# Patient Record
Sex: FEMALE | Race: BLACK OR AFRICAN AMERICAN | NOT HISPANIC OR LATINO | URBAN - METROPOLITAN AREA
[De-identification: names, ages, dates, MRNs, and addresses within clinical notes are randomized per-mention and may not be internally consistent; named-entity substitution may affect disease eponyms.]

---

## 2024-03-10 ENCOUNTER — APPOINTMENT (OUTPATIENT)
Dept: RADIOLOGY | Facility: HOSPITAL | Age: 34
End: 2024-03-10
Payer: COMMERCIAL

## 2024-03-10 ENCOUNTER — HOSPITAL ENCOUNTER (OUTPATIENT)
Facility: HOSPITAL | Age: 34
Setting detail: OBSERVATION
Discharge: HOME | End: 2024-03-12
Attending: EMERGENCY MEDICINE | Admitting: INTERNAL MEDICINE
Payer: COMMERCIAL

## 2024-03-10 DIAGNOSIS — J45.51 SEVERE PERSISTENT ASTHMA WITH EXACERBATION (MULTI): Primary | ICD-10-CM

## 2024-03-10 LAB
BASOPHILS # BLD AUTO: 0.05 X10*3/UL (ref 0–0.1)
BASOPHILS NFR BLD AUTO: 0.8 %
EOSINOPHIL # BLD AUTO: 0.4 X10*3/UL (ref 0–0.7)
EOSINOPHIL NFR BLD AUTO: 6.2 %
ERYTHROCYTE [DISTWIDTH] IN BLOOD BY AUTOMATED COUNT: 16.9 % (ref 11.5–14.5)
HCT VFR BLD AUTO: 35 % (ref 36–46)
HGB BLD-MCNC: 11.5 G/DL (ref 12–16)
IMM GRANULOCYTES # BLD AUTO: 0.02 X10*3/UL (ref 0–0.7)
IMM GRANULOCYTES NFR BLD AUTO: 0.3 % (ref 0–0.9)
LYMPHOCYTES # BLD AUTO: 1.63 X10*3/UL (ref 1.2–4.8)
LYMPHOCYTES NFR BLD AUTO: 25.3 %
MCH RBC QN AUTO: 25.3 PG (ref 26–34)
MCHC RBC AUTO-ENTMCNC: 32.9 G/DL (ref 32–36)
MCV RBC AUTO: 77 FL (ref 80–100)
MONOCYTES # BLD AUTO: 0.31 X10*3/UL (ref 0.1–1)
MONOCYTES NFR BLD AUTO: 4.8 %
NEUTROPHILS # BLD AUTO: 4.02 X10*3/UL (ref 1.2–7.7)
NEUTROPHILS NFR BLD AUTO: 62.6 %
NRBC BLD-RTO: 0 /100 WBCS (ref 0–0)
PLATELET # BLD AUTO: 311 X10*3/UL (ref 150–450)
RBC # BLD AUTO: 4.54 X10*6/UL (ref 4–5.2)
WBC # BLD AUTO: 6.4 X10*3/UL (ref 4.4–11.3)

## 2024-03-10 PROCEDURE — 71045 X-RAY EXAM CHEST 1 VIEW: CPT | Performed by: RADIOLOGY

## 2024-03-10 PROCEDURE — 85025 COMPLETE CBC W/AUTO DIFF WBC: CPT | Performed by: EMERGENCY MEDICINE

## 2024-03-10 PROCEDURE — 2500000002 HC RX 250 W HCPCS SELF ADMINISTERED DRUGS (ALT 637 FOR MEDICARE OP, ALT 636 FOR OP/ED): Mod: SE | Performed by: EMERGENCY MEDICINE

## 2024-03-10 PROCEDURE — 94660 CPAP INITIATION&MGMT: CPT

## 2024-03-10 PROCEDURE — 2500000005 HC RX 250 GENERAL PHARMACY W/O HCPCS

## 2024-03-10 PROCEDURE — 94640 AIRWAY INHALATION TREATMENT: CPT

## 2024-03-10 PROCEDURE — 2500000004 HC RX 250 GENERAL PHARMACY W/ HCPCS (ALT 636 FOR OP/ED)

## 2024-03-10 PROCEDURE — 99291 CRITICAL CARE FIRST HOUR: CPT | Mod: 25 | Performed by: EMERGENCY MEDICINE

## 2024-03-10 PROCEDURE — 71045 X-RAY EXAM CHEST 1 VIEW: CPT

## 2024-03-10 PROCEDURE — 99291 CRITICAL CARE FIRST HOUR: CPT | Performed by: EMERGENCY MEDICINE

## 2024-03-10 PROCEDURE — 84132 ASSAY OF SERUM POTASSIUM: CPT | Performed by: EMERGENCY MEDICINE

## 2024-03-10 PROCEDURE — 87636 SARSCOV2 & INF A&B AMP PRB: CPT | Performed by: EMERGENCY MEDICINE

## 2024-03-10 PROCEDURE — 36415 COLL VENOUS BLD VENIPUNCTURE: CPT | Performed by: EMERGENCY MEDICINE

## 2024-03-10 PROCEDURE — 87634 RSV DNA/RNA AMP PROBE: CPT | Performed by: STUDENT IN AN ORGANIZED HEALTH CARE EDUCATION/TRAINING PROGRAM

## 2024-03-10 RX ORDER — ALBUTEROL SULFATE 0.83 MG/ML
SOLUTION RESPIRATORY (INHALATION)
Status: DISPENSED
Start: 2024-03-10 | End: 2024-03-11

## 2024-03-10 RX ORDER — EPINEPHRINE 1 MG/ML
INJECTION, SOLUTION, CONCENTRATE INTRAVENOUS
Status: COMPLETED
Start: 2024-03-10 | End: 2024-03-10

## 2024-03-10 RX ORDER — EPINEPHRINE 0.1 MG/ML
0.3 INJECTION INTRACARDIAC; INTRAVENOUS ONCE
Status: DISCONTINUED | OUTPATIENT
Start: 2024-03-10 | End: 2024-03-12 | Stop reason: HOSPADM

## 2024-03-10 RX ORDER — ALBUTEROL SULFATE 0.83 MG/ML
2.5 SOLUTION RESPIRATORY (INHALATION)
Status: COMPLETED | OUTPATIENT
Start: 2024-03-10 | End: 2024-03-10

## 2024-03-10 RX ADMIN — ALBUTEROL SULFATE 2.5 MG: 2.5 SOLUTION RESPIRATORY (INHALATION) at 22:25

## 2024-03-10 RX ADMIN — ALBUTEROL SULFATE 2.5 MG: 2.5 SOLUTION RESPIRATORY (INHALATION) at 22:10

## 2024-03-10 RX ADMIN — ALBUTEROL SULFATE 2.5 MG: 2.5 SOLUTION RESPIRATORY (INHALATION) at 21:55

## 2024-03-10 RX ADMIN — EPINEPHRINE 0.3 MG: 1 INJECTION, SOLUTION, CONCENTRATE INTRAVENOUS at 21:50

## 2024-03-10 RX ADMIN — Medication: at 21:55

## 2024-03-11 PROBLEM — J45.51 SEVERE PERSISTENT ASTHMA WITH EXACERBATION (MULTI): Status: ACTIVE | Noted: 2024-03-11

## 2024-03-11 LAB
ANION GAP BLDV CALCULATED.4IONS-SCNC: 9 MMOL/L (ref 10–25)
BASE EXCESS BLDV CALC-SCNC: -0.8 MMOL/L (ref -2–3)
BASE EXCESS BLDV CALC-SCNC: -2.8 MMOL/L (ref -2–3)
BODY TEMPERATURE: 37 DEGREES CELSIUS
BODY TEMPERATURE: 37 DEGREES CELSIUS
CA-I BLDV-SCNC: 1.13 MMOL/L (ref 1.1–1.33)
CHLORIDE BLDV-SCNC: 103 MMOL/L (ref 98–107)
FERRITIN SERPL-MCNC: 24 NG/ML (ref 8–150)
FLUAV RNA RESP QL NAA+PROBE: NOT DETECTED
FLUBV RNA RESP QL NAA+PROBE: NOT DETECTED
GLUCOSE BLDV-MCNC: 156 MG/DL (ref 74–99)
HCO3 BLDV-SCNC: 23.2 MMOL/L (ref 22–26)
HCO3 BLDV-SCNC: 26.2 MMOL/L (ref 22–26)
HCT VFR BLD EST: 38 % (ref 36–46)
HGB BLDV-MCNC: 12.6 G/DL (ref 12–16)
INHALED O2 CONCENTRATION: 100 %
INHALED O2 CONCENTRATION: 30 %
IRON SATN MFR SERPL: 5 % (ref 25–45)
IRON SERPL-MCNC: 21 UG/DL (ref 35–150)
LACTATE BLDV-SCNC: 1.8 MMOL/L (ref 0.4–2)
OXYHGB MFR BLDV: 73.4 % (ref 45–75)
OXYHGB MFR BLDV: 87.2 % (ref 45–75)
PCO2 BLDV: 44 MM HG (ref 41–51)
PCO2 BLDV: 52 MM HG (ref 41–51)
PH BLDV: 7.31 PH (ref 7.33–7.43)
PH BLDV: 7.33 PH (ref 7.33–7.43)
PO2 BLDV: 49 MM HG (ref 35–45)
PO2 BLDV: 60 MM HG (ref 35–45)
POTASSIUM BLDV-SCNC: 3.5 MMOL/L (ref 3.5–5.3)
RSV RNA RESP QL NAA+PROBE: NOT DETECTED
SAO2 % BLDV: 74 % (ref 45–75)
SAO2 % BLDV: 88 % (ref 45–75)
SARS-COV-2 RNA RESP QL NAA+PROBE: NOT DETECTED
SODIUM BLDV-SCNC: 135 MMOL/L (ref 136–145)
TIBC SERPL-MCNC: 446 UG/DL (ref 240–445)
UIBC SERPL-MCNC: 425 UG/DL (ref 110–370)

## 2024-03-11 PROCEDURE — 96376 TX/PRO/DX INJ SAME DRUG ADON: CPT

## 2024-03-11 PROCEDURE — 94660 CPAP INITIATION&MGMT: CPT

## 2024-03-11 PROCEDURE — G0378 HOSPITAL OBSERVATION PER HR: HCPCS

## 2024-03-11 PROCEDURE — 94640 AIRWAY INHALATION TREATMENT: CPT

## 2024-03-11 PROCEDURE — 2500000002 HC RX 250 W HCPCS SELF ADMINISTERED DRUGS (ALT 637 FOR MEDICARE OP, ALT 636 FOR OP/ED): Performed by: STUDENT IN AN ORGANIZED HEALTH CARE EDUCATION/TRAINING PROGRAM

## 2024-03-11 PROCEDURE — C9113 INJ PANTOPRAZOLE SODIUM, VIA: HCPCS | Performed by: STUDENT IN AN ORGANIZED HEALTH CARE EDUCATION/TRAINING PROGRAM

## 2024-03-11 PROCEDURE — 99233 SBSQ HOSP IP/OBS HIGH 50: CPT

## 2024-03-11 PROCEDURE — 96374 THER/PROPH/DIAG INJ IV PUSH: CPT

## 2024-03-11 PROCEDURE — 82728 ASSAY OF FERRITIN: CPT | Performed by: STUDENT IN AN ORGANIZED HEALTH CARE EDUCATION/TRAINING PROGRAM

## 2024-03-11 PROCEDURE — 2500000004 HC RX 250 GENERAL PHARMACY W/ HCPCS (ALT 636 FOR OP/ED): Performed by: STUDENT IN AN ORGANIZED HEALTH CARE EDUCATION/TRAINING PROGRAM

## 2024-03-11 PROCEDURE — 36415 COLL VENOUS BLD VENIPUNCTURE: CPT | Performed by: STUDENT IN AN ORGANIZED HEALTH CARE EDUCATION/TRAINING PROGRAM

## 2024-03-11 PROCEDURE — 99291 CRITICAL CARE FIRST HOUR: CPT | Performed by: STUDENT IN AN ORGANIZED HEALTH CARE EDUCATION/TRAINING PROGRAM

## 2024-03-11 PROCEDURE — 82805 BLOOD GASES W/O2 SATURATION: CPT

## 2024-03-11 PROCEDURE — 94799 UNLISTED PULMONARY SVC/PX: CPT

## 2024-03-11 PROCEDURE — 36415 COLL VENOUS BLD VENIPUNCTURE: CPT

## 2024-03-11 PROCEDURE — 1100000001 HC PRIVATE ROOM DAILY

## 2024-03-11 PROCEDURE — 96375 TX/PRO/DX INJ NEW DRUG ADDON: CPT

## 2024-03-11 PROCEDURE — 83540 ASSAY OF IRON: CPT | Performed by: STUDENT IN AN ORGANIZED HEALTH CARE EDUCATION/TRAINING PROGRAM

## 2024-03-11 RX ORDER — IPRATROPIUM BROMIDE 0.5 MG/2.5ML
0.5 SOLUTION RESPIRATORY (INHALATION)
Status: DISCONTINUED | OUTPATIENT
Start: 2024-03-11 | End: 2024-03-11

## 2024-03-11 RX ORDER — ALBUTEROL SULFATE 0.83 MG/ML
2.5 SOLUTION RESPIRATORY (INHALATION)
COMMUNITY
Start: 2020-07-07 | End: 2024-03-12 | Stop reason: HOSPADM

## 2024-03-11 RX ORDER — FLUTICASONE FUROATE AND VILANTEROL 100; 25 UG/1; UG/1
1 POWDER RESPIRATORY (INHALATION)
Status: DISCONTINUED | OUTPATIENT
Start: 2024-03-11 | End: 2024-03-12 | Stop reason: HOSPADM

## 2024-03-11 RX ORDER — PANTOPRAZOLE SODIUM 40 MG/10ML
40 INJECTION, POWDER, LYOPHILIZED, FOR SOLUTION INTRAVENOUS DAILY
Status: DISCONTINUED | OUTPATIENT
Start: 2024-03-11 | End: 2024-03-12 | Stop reason: HOSPADM

## 2024-03-11 RX ORDER — IPRATROPIUM BROMIDE AND ALBUTEROL SULFATE 2.5; .5 MG/3ML; MG/3ML
3 SOLUTION RESPIRATORY (INHALATION)
Status: DISCONTINUED | OUTPATIENT
Start: 2024-03-11 | End: 2024-03-12 | Stop reason: HOSPADM

## 2024-03-11 RX ORDER — FLUTICASONE FUROATE AND VILANTEROL 100; 25 UG/1; UG/1
1 POWDER RESPIRATORY (INHALATION)
Status: CANCELLED | OUTPATIENT
Start: 2024-03-11

## 2024-03-11 RX ORDER — ALBUTEROL SULFATE 0.83 MG/ML
2.5 SOLUTION RESPIRATORY (INHALATION)
Status: DISCONTINUED | OUTPATIENT
Start: 2024-03-11 | End: 2024-03-11

## 2024-03-11 RX ORDER — ENOXAPARIN SODIUM 100 MG/ML
40 INJECTION SUBCUTANEOUS DAILY
Status: DISCONTINUED | OUTPATIENT
Start: 2024-03-11 | End: 2024-03-12 | Stop reason: HOSPADM

## 2024-03-11 RX ADMIN — METHYLPREDNISOLONE SODIUM SUCCINATE 40 MG: 125 INJECTION, POWDER, FOR SOLUTION INTRAMUSCULAR; INTRAVENOUS at 14:22

## 2024-03-11 RX ADMIN — IPRATROPIUM BROMIDE 0.5 MG: 0.5 SOLUTION RESPIRATORY (INHALATION) at 11:43

## 2024-03-11 RX ADMIN — ALBUTEROL SULFATE 2.5 MG: 2.5 SOLUTION RESPIRATORY (INHALATION) at 14:26

## 2024-03-11 RX ADMIN — METHYLPREDNISOLONE SODIUM SUCCINATE 40 MG: 125 INJECTION, POWDER, FOR SOLUTION INTRAMUSCULAR; INTRAVENOUS at 02:59

## 2024-03-11 RX ADMIN — METHYLPREDNISOLONE SODIUM SUCCINATE 40 MG: 125 INJECTION, POWDER, FOR SOLUTION INTRAMUSCULAR; INTRAVENOUS at 09:46

## 2024-03-11 RX ADMIN — IPRATROPIUM BROMIDE AND ALBUTEROL SULFATE 3 ML: .5; 3 SOLUTION RESPIRATORY (INHALATION) at 20:43

## 2024-03-11 RX ADMIN — PANTOPRAZOLE SODIUM 40 MG: 40 INJECTION, POWDER, FOR SOLUTION INTRAVENOUS at 09:46

## 2024-03-11 RX ADMIN — ENOXAPARIN SODIUM 40 MG: 100 INJECTION SUBCUTANEOUS at 09:46

## 2024-03-11 RX ADMIN — ALBUTEROL SULFATE 2.5 MG: 2.5 SOLUTION RESPIRATORY (INHALATION) at 11:39

## 2024-03-11 RX ADMIN — FLUTICASONE FUROATE AND VILANTEROL TRIFENATATE 1 PUFF: 100; 25 POWDER RESPIRATORY (INHALATION) at 07:59

## 2024-03-11 RX ADMIN — IPRATROPIUM BROMIDE 0.5 MG: 0.5 SOLUTION RESPIRATORY (INHALATION) at 07:59

## 2024-03-11 RX ADMIN — ALBUTEROL SULFATE 2.5 MG: 2.5 SOLUTION RESPIRATORY (INHALATION) at 03:17

## 2024-03-11 RX ADMIN — METHYLPREDNISOLONE SODIUM SUCCINATE 40 MG: 40 INJECTION, POWDER, FOR SOLUTION INTRAMUSCULAR; INTRAVENOUS at 21:43

## 2024-03-11 SDOH — SOCIAL STABILITY: SOCIAL INSECURITY: DOES ANYONE TRY TO KEEP YOU FROM HAVING/CONTACTING OTHER FRIENDS OR DOING THINGS OUTSIDE YOUR HOME?: NO

## 2024-03-11 SDOH — ECONOMIC STABILITY: TRANSPORTATION INSECURITY
IN THE PAST 12 MONTHS, HAS LACK OF TRANSPORTATION KEPT YOU FROM MEETINGS, WORK, OR FROM GETTING THINGS NEEDED FOR DAILY LIVING?: NO

## 2024-03-11 SDOH — ECONOMIC STABILITY: INCOME INSECURITY: IN THE PAST 12 MONTHS, HAS THE ELECTRIC, GAS, OIL, OR WATER COMPANY THREATENED TO SHUT OFF SERVICE IN YOUR HOME?: NO

## 2024-03-11 SDOH — ECONOMIC STABILITY: TRANSPORTATION INSECURITY
IN THE PAST 12 MONTHS, HAS THE LACK OF TRANSPORTATION KEPT YOU FROM MEDICAL APPOINTMENTS OR FROM GETTING MEDICATIONS?: NO

## 2024-03-11 SDOH — SOCIAL STABILITY: SOCIAL INSECURITY: WITHIN THE LAST YEAR, HAVE YOU BEEN HUMILIATED OR EMOTIONALLY ABUSED IN OTHER WAYS BY YOUR PARTNER OR EX-PARTNER?: NO

## 2024-03-11 SDOH — SOCIAL STABILITY: SOCIAL INSECURITY: DO YOU FEEL ANYONE HAS EXPLOITED OR TAKEN ADVANTAGE OF YOU FINANCIALLY OR OF YOUR PERSONAL PROPERTY?: NO

## 2024-03-11 SDOH — SOCIAL STABILITY: SOCIAL INSECURITY: ARE YOU OR HAVE YOU BEEN THREATENED OR ABUSED PHYSICALLY, EMOTIONALLY, OR SEXUALLY BY ANYONE?: NO

## 2024-03-11 SDOH — ECONOMIC STABILITY: FOOD INSECURITY: WITHIN THE PAST 12 MONTHS, THE FOOD YOU BOUGHT JUST DIDN'T LAST AND YOU DIDN'T HAVE MONEY TO GET MORE.: NEVER TRUE

## 2024-03-11 SDOH — SOCIAL STABILITY: SOCIAL INSECURITY: HAS ANYONE EVER THREATENED TO HURT YOUR FAMILY OR YOUR PETS?: NO

## 2024-03-11 SDOH — ECONOMIC STABILITY: HOUSING INSECURITY
IN THE LAST 12 MONTHS, WAS THERE A TIME WHEN YOU DID NOT HAVE A STEADY PLACE TO SLEEP OR SLEPT IN A SHELTER (INCLUDING NOW)?: NO

## 2024-03-11 SDOH — ECONOMIC STABILITY: INCOME INSECURITY: IN THE LAST 12 MONTHS, WAS THERE A TIME WHEN YOU WERE NOT ABLE TO PAY THE MORTGAGE OR RENT ON TIME?: NO

## 2024-03-11 SDOH — SOCIAL STABILITY: SOCIAL INSECURITY: ABUSE: ADULT

## 2024-03-11 SDOH — SOCIAL STABILITY: SOCIAL INSECURITY: ARE THERE ANY APPARENT SIGNS OF INJURIES/BEHAVIORS THAT COULD BE RELATED TO ABUSE/NEGLECT?: NO

## 2024-03-11 SDOH — ECONOMIC STABILITY: HOUSING INSECURITY: IN THE LAST 12 MONTHS, HOW MANY PLACES HAVE YOU LIVED?: 1

## 2024-03-11 SDOH — SOCIAL STABILITY: SOCIAL INSECURITY
WITHIN THE LAST YEAR, HAVE TO BEEN RAPED OR FORCED TO HAVE ANY KIND OF SEXUAL ACTIVITY BY YOUR PARTNER OR EX-PARTNER?: NO

## 2024-03-11 SDOH — SOCIAL STABILITY: SOCIAL INSECURITY: HAVE YOU HAD THOUGHTS OF HARMING ANYONE ELSE?: NO

## 2024-03-11 SDOH — SOCIAL STABILITY: SOCIAL INSECURITY: DO YOU FEEL UNSAFE GOING BACK TO THE PLACE WHERE YOU ARE LIVING?: NO

## 2024-03-11 SDOH — SOCIAL STABILITY: SOCIAL INSECURITY: WERE YOU ABLE TO COMPLETE ALL THE BEHAVIORAL HEALTH SCREENINGS?: YES

## 2024-03-11 SDOH — SOCIAL STABILITY: SOCIAL INSECURITY
WITHIN THE LAST YEAR, HAVE YOU BEEN KICKED, HIT, SLAPPED, OR OTHERWISE PHYSICALLY HURT BY YOUR PARTNER OR EX-PARTNER?: NO

## 2024-03-11 SDOH — ECONOMIC STABILITY: FOOD INSECURITY: WITHIN THE PAST 12 MONTHS, YOU WORRIED THAT YOUR FOOD WOULD RUN OUT BEFORE YOU GOT MONEY TO BUY MORE.: NEVER TRUE

## 2024-03-11 SDOH — SOCIAL STABILITY: SOCIAL INSECURITY: WITHIN THE LAST YEAR, HAVE YOU BEEN AFRAID OF YOUR PARTNER OR EX-PARTNER?: NO

## 2024-03-11 SDOH — ECONOMIC STABILITY: INCOME INSECURITY: HOW HARD IS IT FOR YOU TO PAY FOR THE VERY BASICS LIKE FOOD, HOUSING, MEDICAL CARE, AND HEATING?: NOT HARD AT ALL

## 2024-03-11 ASSESSMENT — COGNITIVE AND FUNCTIONAL STATUS - GENERAL
MOBILITY SCORE: 24
PATIENT BASELINE BEDBOUND: NO
DAILY ACTIVITIY SCORE: 24

## 2024-03-11 ASSESSMENT — PAIN - FUNCTIONAL ASSESSMENT
PAIN_FUNCTIONAL_ASSESSMENT: 0-10

## 2024-03-11 ASSESSMENT — ACTIVITIES OF DAILY LIVING (ADL)
HEARING - RIGHT EAR: FUNCTIONAL
JUDGMENT_ADEQUATE_SAFELY_COMPLETE_DAILY_ACTIVITIES: YES
FEEDING YOURSELF: INDEPENDENT
HEARING - LEFT EAR: FUNCTIONAL
DRESSING YOURSELF: INDEPENDENT
TOILETING: INDEPENDENT
LACK_OF_TRANSPORTATION: NO
PATIENT'S MEMORY ADEQUATE TO SAFELY COMPLETE DAILY ACTIVITIES?: YES
GROOMING: INDEPENDENT
ADEQUATE_TO_COMPLETE_ADL: YES
WALKS IN HOME: INDEPENDENT
BATHING: INDEPENDENT

## 2024-03-11 ASSESSMENT — LIFESTYLE VARIABLES
AUDIT-C TOTAL SCORE: 2
HOW MANY STANDARD DRINKS CONTAINING ALCOHOL DO YOU HAVE ON A TYPICAL DAY: 1 OR 2
HOW OFTEN DO YOU HAVE A DRINK CONTAINING ALCOHOL: 2-4 TIMES A MONTH
SKIP TO QUESTIONS 9-10: 1
HOW OFTEN DO YOU HAVE 6 OR MORE DRINKS ON ONE OCCASION: NEVER
AUDIT-C TOTAL SCORE: 2

## 2024-03-11 ASSESSMENT — PATIENT HEALTH QUESTIONNAIRE - PHQ9
1. LITTLE INTEREST OR PLEASURE IN DOING THINGS: NOT AT ALL
SUM OF ALL RESPONSES TO PHQ9 QUESTIONS 1 & 2: 0
2. FEELING DOWN, DEPRESSED OR HOPELESS: NOT AT ALL

## 2024-03-11 ASSESSMENT — COLUMBIA-SUICIDE SEVERITY RATING SCALE - C-SSRS
6. HAVE YOU EVER DONE ANYTHING, STARTED TO DO ANYTHING, OR PREPARED TO DO ANYTHING TO END YOUR LIFE?: NO
2. HAVE YOU ACTUALLY HAD ANY THOUGHTS OF KILLING YOURSELF?: NO
1. IN THE PAST MONTH, HAVE YOU WISHED YOU WERE DEAD OR WISHED YOU COULD GO TO SLEEP AND NOT WAKE UP?: NO

## 2024-03-11 ASSESSMENT — PAIN SCALES - GENERAL
PAINLEVEL_OUTOF10: 0 - NO PAIN
PAINLEVEL_OUTOF10: 0 - NO PAIN

## 2024-03-11 NOTE — CARE PLAN
Problem: Safety  Goal: Patient will be injury free during hospitalization  Outcome: Progressing  Goal: I will remain free of falls  Outcome: Progressing     Problem: Psychosocial Needs  Goal: Demonstrates ability to cope with hospitalization/illness  Outcome: Progressing  Goal: Collaborate with me, my family, and caregiver to identify my specific goals  Outcome: Progressing   The patient's goals for the shift include

## 2024-03-11 NOTE — ED PROVIDER NOTES
"HPI   No chief complaint on file.      33F with history of asthma ( w/ history of intubation) brought in by Ems for increased work of breathing and shortness of breath. Per EMS, patient was found \"doubled over\" with significantly increased WOB. En route she received IV solumedrol, duonebs, mag, and 1 dose of IM epinephrine. On presentation in Ed she is on NRB mask and has significant increased work of breathing. She is able to communicate and does not appear to have altered mental status. History limted due to patient compromised respiratory status. Per chart review, she has a history of multiple admissions for asthma episodes.                          No data recorded                   Patient History   No past medical history on file.  No past surgical history on file.  No family history on file.  Social History     Tobacco Use    Smoking status: Not on file    Smokeless tobacco: Not on file   Substance Use Topics    Alcohol use: Not on file    Drug use: Not on file       Physical Exam   ED Triage Vitals [03/10/24 2145]   Temperature Heart Rate Respirations BP   36.3 °C (97.3 °F) (!) 116 (!) 21 (!) 160/116      Pulse Ox Temp Source Heart Rate Source Patient Position   100 % Temporal -- --      BP Location FiO2 (%)     -- --       Physical Exam  Constitutional:       General: She is in acute distress.   HENT:      Head: Normocephalic and atraumatic.   Cardiovascular:      Rate and Rhythm: Regular rhythm. Tachycardia present.   Pulmonary:      Effort: Tachypnea and accessory muscle usage present.      Breath sounds: Wheezing present.      Comments: Increased WOB with accessory muscle use on non-rebreather   Abdominal:      Palpations: Abdomen is soft.   Musculoskeletal:      Cervical back: Normal range of motion.      Right lower leg: No edema.      Left lower leg: No edema.   Skin:     General: Skin is warm and dry.   Neurological:      General: No focal deficit present.      Mental Status: She is alert and " oriented to person, place, and time.   Psychiatric:         Mood and Affect: Mood normal.         ED Course & MDM   Diagnoses as of 03/11/24 0121   Severe persistent asthma with exacerbation       Medical Decision Making  33F presented to ED with increased WOB and signs of respiratory distress. En route was give steroids, magnesium, epinephrine, and duonebs with modest improvement. Patient placed on Bipap with observed benefit within 30 minutes. Additional dose of epinephrine given in ED. Serial VBG's obtained which demonstrated improvement in pH, pCO2 and pO2. Despite improvement she remains at increased risk for respiratory compromise considering her history of recurrent asthma exacerbations, difficulty with long acting inhaler compliance, and history of requiring intubation during asthma episode. She is to be admitted to the ICU for further management of asthma attack.          Procedure  Procedures     Helder Resendiz DO  Resident  03/11/24 0158

## 2024-03-11 NOTE — SIGNIFICANT EVENT
Floor Readiness Note       I, personally, evaluated Suha Zacarias prior to transfer to the floor, including reviewing all current laboratory and imaging studies. The patient remains appropriate for transfer to the floor. Bedside nurse and respiratory therapy are also in agreement of patient's readiness for the floor.     Brief summary:  Suha Zacarias is a 33 y.o. female who was admitted to the MICU on 3/10 for hypercapnic respiratory failure secondary to an asthma exacerbation secondary to missing doses for medications. They have been treated with methylprednisolone, ipratropium nebs, albuterol nebs, and Breo-Ellipta inhaler.     Updated focused Physical Exam:  Cardiovascular: Tachycardic, no rubs, murmurs or gallops  Pulmonary: Normal work of breathing, diffuse quiet expiratory wheezes    Current Vital Signs:  Heart Rate: 83 (03/11/24 1000 : Desire Morris RN)  BP: 124/72 (03/11/24 1000 : Desire Morris RN)  Temp: 37.4 °C (99.3 °F) (03/11/24 1142 : Sav Mosqueda)  Resp: 21 (03/11/24 1000 : Desire Morris RN)  SpO2: 95 % (03/11/24 1000 : Desire Morris RN)    Relevant updates since rounds:  none    Accepting team, Hospitalist B, received verbal sign out and the Provider Care team/Attending has been updated. Bedside nurse will now call accepting nurse for report and patient will be transferred to [place] [id number].    Delfino Beltran MD

## 2024-03-11 NOTE — ED PROVIDER NOTES
Patient was signed out to me by previous physician.  33-year-old female history of asthma who has been intubated previously presenting to the emergency department for asthma exacerbation continuously requiring BiPAP with persistent respiratory acidemia.  Mentating appropriately.  Received nebs, steroids, 2 doses of epinephrine, and is now on continuous albuterol along with IV magnesium.  She is still persistently rhonchorous and requiring consistent continuous albuterol with increased work of breathing.  Due to this patient's tenuous status with her asthma, she will be admitted to the intensive care unit.     Jeffrey Mireles MD  03/11/24 0116

## 2024-03-11 NOTE — ED PROCEDURE NOTE
Procedure  Critical Care    Performed by: Matt Levy MD  Authorized by: Matt Levy MD    Critical care provider statement:     Critical care time (minutes):  45    Critical care time was exclusive of:  Separately billable procedures and treating other patients    Critical care was necessary to treat or prevent imminent or life-threatening deterioration of the following conditions:  Respiratory failure    Critical care was time spent personally by me on the following activities:  Ordering and performing treatments and interventions, ordering and review of laboratory studies, ordering and review of radiographic studies, pulse oximetry, re-evaluation of patient's condition, blood draw for specimens, evaluation of patient's response to treatment and examination of patient               Matt Levy MD  03/10/24 7123

## 2024-03-11 NOTE — PROGRESS NOTES
Pharmacy Medication History Review    Suha Zacarias is a 33 y.o. female admitted for Severe persistent asthma with exacerbation. Pharmacy reviewed the patient's [id number] medications and allergies for accuracy.    The list below reflects the updated PTA list. Comments regarding how patient may be taking medications differently can be found in the Admit Orders Activity  Prior to Admission Medications   Prescriptions Last Dose Informant Patient Reported?   albuterol 2.5 mg /3 mL (0.083 %) nebulizer solution Unknown Self Yes   Sig: Inhale 3 mL (2.5 mg).      Facility-Administered Medications: None        The list below reflects the updated allergy list. Please review each documented allergy for additional clarification and justification.  Allergies  Reviewed by [facility] on 3/11/2024        Severity Reactions Comments    Cough Liquid Not Specified Hives     Dextromethorphan Low Rash             Patient accepts M2B at discharge. Pharmacy has been updated to [facility].    Sources used to confirm home medication list include: Patient interview, OARRS, Care Everywhere, medication fill history.    Below are additional concerns with the patient's PTA list:  Patient was a poor historian.  She stated that she is allergic to 'cough syrup' but did not know the exact name, allergies added from [facility] screen.   Patient at first stated that she does not take any medications at home, then said that she needs a new nebulizer for her albuterol. No fill history for any medication was found.    [facility]   Transitions of Care Pharmacist  [facility]  Please reach out via Secure Chat for questions, or if no response call Hand Talk or Klick2Contact

## 2024-03-11 NOTE — PROGRESS NOTES
ICU to [place] Transfer Summary     I:  ICU Admission Reason & Brief ICU Course:    Ms. Zacarias is a 33-year-old female with PMH of severe persistent asthma with multiple exacerbations with a hospitalization requiring intubation in the past, polysubstance use admitted to MICU 2/2 to asthma exacerbation requiring BiPAP titration. Known triggers are medication dose missing, heat and potentially her furnace. Patient states that 3 days ago she started to feel short of breath. Last night she attempted to use her albuterol nebulize however it broke. She states today her breathing became significantly worse therefore she called EMS who brought her to the ED.  En route to the ED, she received solumedrol. On presentation to the ED, she was afebrile to 36.3, tachypnic to 21, and normotensive. Admission labs showed electrolytes within normal limits, and a creatinine of 0.81. ABG on admission was 7.31/52/49.  She was COVID/Flu/RSV-. She was started on BiPAP 10/5, methylprednisolone 40 mg Q6H, albuterol Q4H, ipratropium Q6H,  and transferred to the MICU. In the morning, her blood gas had improved to 7.33/44 on VBG. Her breathing had improved to the point she could be weaned to room air in the morning, but she was still diffusely wheezy, so was continued on nebulizers and current methylprednisolone dose. She will need follow up with pulmonology outpatient for consistent management of her asthma.       C: Code Status/DPOA Info/Goals of Care/ACP Note    Full Code  DPOA/Contact Number: Gisell Zambrano ([phone])    U: Unprescribing & Pertinent High-Risk Medications    Changes to home meds: None (albuterol nebs increased in frequency for asthma exacerbation)     Anticoagulation: Yes - [facility]    Antibiotics:   [x] N/A - no current planned antimicrobioals      P: Pending Tests at the Time of Transfer   None      A: Active consultants, including Rehab:   []  Subspecialty Consultants:   [x]  PT  [x]  OT  []  SLP  []  Wound Care    U: Uncertainty  Measure/Diagnostic Pause:    Working diagnosis at the time of transfer acute asthma exacerbation, though ddx includes      Diagnosis Degree of Certainty: 1. High degree of certainty about the clinical diagnosis.     S: Summary of Major Problems and To-Dos:   33-year-old female with PMH of asthma with multiple exacerbations, requiring intubation (unclear), polysubstance use  admitted to MICU 2/2 to asthma exacerbation 2/2 to lack of maintenance inhaler use .     Neuro   #polysubstance use ( hx of ectasy)   -Utox pending     Cardiology   #No active issue      Pulmonary   #Acute Hypoxic respiratory failure   # Asthma exacerbation most likely 2/2 to medication noncompliance   - continues to wheeze but improved from yesterday  -exposure to allergens, newer cat, THC  - Noncompliant with maintenance medications    - Chest Xray:  Prominent interstitial markings likely corresponding to patient's provided history of asthma.   -COVID, flu A/B, RSV PCR panel negative      PLAN  -Smoking cessation   -c/w albuterol nebulized Q4H   -c/w on methylprednisolone  40 mg Q6H  -c/w ipratropium Q6H  -Breo-Ellipta 100-25 one puff a day    GI  - No active issues      Endocrine   - no active issues      Hem/onc   #Microcytic Anemia, most likely iron deficiency  -Hgb 11.4, MCV 77  - Tsat of 5%, Ferritin of 24  - Should start iron sulfate 325 mg Q48H on discharge     F: PRN  E: PRN  N: Regular diet  GI: Pantoprazole, 40 mg (while on high dose steroid)  DVT ppx: lovenox   Access/lines:PIV   Abx: none   O2: Bipap 10/5      Code status: Full Code   NOK:  Gisell Zambrano (Parent)  [phone] (Work Phone)   E: Exam, including Lines/Drains/Airways & Data Review:   General: Patient is awake, non-toxic appearing, normal body habitus  Pulm: Normal WOB at rest, diffuse wheezing heard bilaterally  Cardiac: Regular rate and rhythm, normal S1/S2  Abdomen: Non-tender to palpation, non-distended  Extremities: No peripheral edema, or cyanosis  Neuro: Patient  alert and orientedx4, cranial nerves grossly intact, normal strength and sensation.    Difficult airway? No  Lines/drains assessed for removal? No    Within 30 minutes of the patient physically leaving the floor, a Floor Readiness Note needs to be placed with updated vitals.

## 2024-03-11 NOTE — ED PROCEDURE NOTE
Procedure  Critical Care    Performed by: Jeffrey Mireles MD  Authorized by: Jeffrey Mireles MD    Critical care provider statement:     Critical care time (minutes):  22    Critical care time was exclusive of:  Teaching time and separately billable procedures and treating other patients    Critical care was necessary to treat or prevent imminent or life-threatening deterioration of the following conditions:  Respiratory failure    Critical care was time spent personally by me on the following activities:  Discussions with consultants, discussions with primary provider, ordering and review of laboratory studies, ordering and review of radiographic studies, re-evaluation of patient's condition, review of old charts, examination of patient and evaluation of patient's response to treatment               Jeffrey Mireles MD  03/11/24 0117

## 2024-03-11 NOTE — H&P
History Of Present Illness  33-year-old female with PMH of asthma with multiple exacerbations, requiring intubation (unclear), polysubstance use  admitted to MICU 2/2 to asthma exacerbation. Patient states that 3 days ago she started to feel short of breath. Last night she attempted to use her albuterol nebulize however it broke. She states today her breathing became significantly worse therefore she called EMS who brought her to the ED. Patient also endorse wheezing and nonproductive cough. Patient states the symptoms are consistent with her usual asthma exacerbation. She states she uses an albuterol inhaler at home however it does not give her any relief. She does not have a PCP nor pulmonologist. She states her main triggers are heat. She also states that she things her furnace triggers she asthma therefore she has not turned on the heat in her apartment. She denies sick contact or recent travel. Patient denies headache, syncope, dizziness, nasal congestion, rhinorrhea, dysphagia, neck pain, back pain, nausea, vomiting, abdominal pain, extremity pain, swelling, or any skin rashes.     ED Course:  VS: T36.3 HR 91 /116 ( at arrival) 137/93 ( in MICU), RR 21 O2 on RA  Labs:  CBC- WBC6.4 Hgb 11.5 Plt 311   RFP- Na 139, K3.3 Cl 103 bicarb 21 BUN 14 Cr 0.81 glucose 71    HFP- AST 52 ALT 59 Alk Phos 56   ABG 7.31/52/49 lactate 1.8   Imaging  CXR 3/10:  IMPRESSION:  1.  Prominent interstitial markings likely corresponding to patient's  provided history of asthma. No focal consolidation, sizeable pleural  Effusion or pneumothorax.      ED Interventions:   Albuterol nebulizer, solumederol(received in the field), 2 doses of epinephrine, magnesium sulfate, on BiPap 10/5    PAST MEDICAL HISTORY   Diagnosis Date   Asthma   Smoking 8/2/2022     PAST SURGICAL HISTORY  History reviewed. No pertinent surgical history.    FAMILY HISTORY  History reviewed. No pertinent family history.    SOCIAL HISTORY   Social History      Tobacco Use   Smoking status: Some Days   Types: Cigarettes   Smokeless tobacco: Never   Tobacco comments:   1 cigarette a week per pt   Substance Use Topics   Alcohol use: Not Currently   Drug use: Yes   Types: Marijuana     Asthma History  Childhood asthma history / symptoms:  Hx of Atopy or allergies (skin, GI, sino-nasal, NSAID/Aspirin see below):  When was asthma diagnosed? 4 years old   What are current asthma meds? Albuterol via inhaler/nebulizer   What are asthma symptoms? Shortness of breath, wheezing, sometimes looses voice  Symptomatic days/week: Everyday   Presence of nocturnal symptoms: Yes  Associated cough:   Time of symptom onset?   Impairment of activities:   History of taking oral steroids: Yes  History of hospitalization for asthma exacerbation: Yes  History of intubation: Yes  What are patient triggers:heat, changes in temperature, dust   Worst seasons: winter   Ever experienced symptoms after taking aspirin:   Smoking status: active cigarette smoker   COVID:   Vaccinations: Influenza   Pneumococcal   COVID     Review of Systems     Physical Exam  Constitutional:       General: She is in acute distress.   HENT:      Mouth/Throat:      Mouth: Mucous membranes are moist.   Eyes:      Pupils: Pupils are equal, round, and reactive to light.   Cardiovascular:      Rate and Rhythm: Normal rate and regular rhythm.      Pulses: Normal pulses.      Heart sounds: Normal heart sounds. No murmur heard.     No gallop.   Pulmonary:      Breath sounds: Wheezing present.      Comments: In mild respiratory stress   Loud expiratory wheezing   Abdominal:      General: Abdomen is flat. Bowel sounds are normal.      Palpations: Abdomen is soft.   Musculoskeletal:         General: Normal range of motion.      Cervical back: No rigidity or tenderness.   Skin:     General: Skin is warm and dry.   Neurological:      General: No focal deficit present.      Mental Status: She is alert.   Psychiatric:         Mood and  Affect: Mood normal.          Last Recorded Vitals  Blood pressure (!) 142/99, pulse 91, temperature 36.3 °C (97.3 °F), temperature source Temporal, resp. rate 16, SpO2 100 %.    Relevant Results      CXR:   IMPRESSION:  1.  Prominent interstitial markings likely corresponding to patient's  provided history of asthma. No focal consolidation, sizeable pleural  effusion or pneumothorax.     Assessment/Plan   Principal Problem:    Severe persistent asthma with exacerbation    33-year-old female with PMH of asthma with multiple exacerbations, requiring intubation (unclear), polysubstance use  admitted to MICU 2/2 to asthma exacerbation 2/2 to lack of maintenance inhaler use vs infectious etiology     Neuro   #polysubstance use ( hx of ectasy)   -Utox pending   Cardiology   #No active issue    Pulmonary   #Acute Hypoxic respiratory failure   # Asthma exacerbation most likely 2/2 to medication noncompliance   - continues to wheeze but improved from yesterday  -exposure to allergens, newer cat, THC  - Noncompliant with maintenance medications    - Chest Xray:  Prominent interstitial markings likely corresponding to patient's provided history of asthma.   -COVID, flu A/B, RSV PCR panel negative    PLAN  -Smoking cessation   -c/w albuterol nebulized   -c/w on methylprednisolone   -c/w ipratropium   -started Symbicort 80-4.5 2 puffs BID  GI  - No active issues     Endocrine   - no active issues     Hem/onc   -mild microcytic anemia   - iron studies pending            F: PRN  E: PRN  N: NPO on bipap   GI:PPI   DVT ppx: lovenox   Access/lines:PIV   Abx: none   O2: Bipap 10/5     Code status: Full Code   NOK:  Gisell Zambrano (Parent)  [phone] (Work Phone)                Kristy Ramirez MD

## 2024-03-11 NOTE — PROGRESS NOTES
Pharmacy Admission Order Reconciliation Review    Suha Zacarias is a 33 y.o. female admitted for Severe persistent asthma with exacerbation. Pharmacy reviewed the patient's unreconciled admission medications.    Prior to admission medications that were reviewed and acted on by the pharmacist include:  Albuterol 2.5 mg/ 3 ml inh sol,   These medications have been reconciled.     Any other unreconcilied medications have been addressed and will be ordered or held by the patient's medical team. Medications addressed by the pharmacist may be added or changed by the patient's medical team at any time.    Char Gomez, Carlos  Transitions of Care Pharmacist  [facility]  Please reach out via Secure Chat for questions, or if no response call [phone]

## 2024-03-11 NOTE — PROGRESS NOTES
"    SOCIAL WORK NOTE   SW met with patient at bedside for assessment (please see flowsheets for further details). Patient normally lives at home with wife and son (confirmed safe). She reports that she is independent at baseline. She reports needing a PCP, pulmonologist, and \"breathing machine\" Nebulizer?. Team updated. Social work to follow.  Kae Khan, JEFF, LISW-S ([id number])   "

## 2024-03-12 VITALS
DIASTOLIC BLOOD PRESSURE: 94 MMHG | HEIGHT: 67 IN | OXYGEN SATURATION: 98 % | SYSTOLIC BLOOD PRESSURE: 138 MMHG | HEART RATE: 105 BPM | BODY MASS INDEX: 26.12 KG/M2 | RESPIRATION RATE: 16 BRPM | WEIGHT: 166.45 LBS | TEMPERATURE: 98.4 F

## 2024-03-12 LAB
ALBUMIN SERPL BCP-MCNC: 4.4 G/DL (ref 3.4–5)
ALP SERPL-CCNC: 67 U/L (ref 33–110)
ALT SERPL W P-5'-P-CCNC: 42 U/L (ref 7–45)
AMPHETAMINES UR QL SCN: ABNORMAL
ANION GAP SERPL CALC-SCNC: 19 MMOL/L (ref 10–20)
AST SERPL W P-5'-P-CCNC: 22 U/L (ref 9–39)
BARBITURATES UR QL SCN: ABNORMAL
BENZODIAZ UR QL SCN: ABNORMAL
BILIRUB SERPL-MCNC: 0.2 MG/DL (ref 0–1.2)
BUN SERPL-MCNC: 17 MG/DL (ref 6–23)
BZE UR QL SCN: ABNORMAL
CALCIUM SERPL-MCNC: 10.2 MG/DL (ref 8.6–10.6)
CANNABINOIDS UR QL SCN: ABNORMAL
CHLORIDE SERPL-SCNC: 106 MMOL/L (ref 98–107)
CO2 SERPL-SCNC: 20 MMOL/L (ref 21–32)
CREAT SERPL-MCNC: 0.95 MG/DL (ref 0.5–1.05)
EGFRCR SERPLBLD CKD-EPI 2021: 82 ML/MIN/1.73M*2
ERYTHROCYTE [DISTWIDTH] IN BLOOD BY AUTOMATED COUNT: 17.4 % (ref 11.5–14.5)
FENTANYL+NORFENTANYL UR QL SCN: ABNORMAL
GLUCOSE SERPL-MCNC: 96 MG/DL (ref 74–99)
HCT VFR BLD AUTO: 38.2 % (ref 36–46)
HGB BLD-MCNC: 12.5 G/DL (ref 12–16)
MCH RBC QN AUTO: 25.6 PG (ref 26–34)
MCHC RBC AUTO-ENTMCNC: 32.7 G/DL (ref 32–36)
MCV RBC AUTO: 78 FL (ref 80–100)
METHADONE UR QL SCN: ABNORMAL
NRBC BLD-RTO: 0 /100 WBCS (ref 0–0)
OPIATES UR QL SCN: ABNORMAL
OXYCODONE+OXYMORPHONE UR QL SCN: ABNORMAL
PCP UR QL SCN: ABNORMAL
PLATELET # BLD AUTO: 368 X10*3/UL (ref 150–450)
POTASSIUM SERPL-SCNC: 4.5 MMOL/L (ref 3.5–5.3)
PROT SERPL-MCNC: 7.5 G/DL (ref 6.4–8.2)
RBC # BLD AUTO: 4.88 X10*6/UL (ref 4–5.2)
SODIUM SERPL-SCNC: 140 MMOL/L (ref 136–145)
WBC # BLD AUTO: 14.1 X10*3/UL (ref 4.4–11.3)

## 2024-03-12 PROCEDURE — 96376 TX/PRO/DX INJ SAME DRUG ADON: CPT

## 2024-03-12 PROCEDURE — 2500000002 HC RX 250 W HCPCS SELF ADMINISTERED DRUGS (ALT 637 FOR MEDICARE OP, ALT 636 FOR OP/ED): Performed by: STUDENT IN AN ORGANIZED HEALTH CARE EDUCATION/TRAINING PROGRAM

## 2024-03-12 PROCEDURE — 36415 COLL VENOUS BLD VENIPUNCTURE: CPT | Performed by: INTERNAL MEDICINE

## 2024-03-12 PROCEDURE — 80307 DRUG TEST PRSMV CHEM ANLYZR: CPT | Performed by: STUDENT IN AN ORGANIZED HEALTH CARE EDUCATION/TRAINING PROGRAM

## 2024-03-12 PROCEDURE — 96374 THER/PROPH/DIAG INJ IV PUSH: CPT

## 2024-03-12 PROCEDURE — 96375 TX/PRO/DX INJ NEW DRUG ADDON: CPT

## 2024-03-12 PROCEDURE — G0378 HOSPITAL OBSERVATION PER HR: HCPCS

## 2024-03-12 PROCEDURE — 80053 COMPREHEN METABOLIC PANEL: CPT | Performed by: INTERNAL MEDICINE

## 2024-03-12 PROCEDURE — 2500000004 HC RX 250 GENERAL PHARMACY W/ HCPCS (ALT 636 FOR OP/ED): Performed by: STUDENT IN AN ORGANIZED HEALTH CARE EDUCATION/TRAINING PROGRAM

## 2024-03-12 PROCEDURE — 99239 HOSP IP/OBS DSCHRG MGMT >30: CPT | Performed by: INTERNAL MEDICINE

## 2024-03-12 PROCEDURE — C9113 INJ PANTOPRAZOLE SODIUM, VIA: HCPCS | Performed by: STUDENT IN AN ORGANIZED HEALTH CARE EDUCATION/TRAINING PROGRAM

## 2024-03-12 PROCEDURE — 85027 COMPLETE CBC AUTOMATED: CPT | Performed by: INTERNAL MEDICINE

## 2024-03-12 PROCEDURE — 94640 AIRWAY INHALATION TREATMENT: CPT

## 2024-03-12 RX ORDER — BUDESONIDE AND FORMOTEROL FUMARATE DIHYDRATE 160; 4.5 UG/1; UG/1
2 AEROSOL RESPIRATORY (INHALATION)
Qty: 10.2 G | Refills: 11 | Status: SHIPPED | OUTPATIENT
Start: 2024-03-12

## 2024-03-12 RX ORDER — ALBUTEROL SULFATE 90 UG/1
2 AEROSOL, METERED RESPIRATORY (INHALATION) EVERY 2 HOUR PRN
Status: DISCONTINUED | OUTPATIENT
Start: 2024-03-12 | End: 2024-03-12 | Stop reason: HOSPADM

## 2024-03-12 RX ORDER — PREDNISONE 10 MG/1
TABLET ORAL
Qty: 20 TABLET | Refills: 0 | Status: SHIPPED | OUTPATIENT
Start: 2024-03-12 | End: 2024-03-20

## 2024-03-12 RX ORDER — ALBUTEROL SULFATE 90 UG/1
2 AEROSOL, METERED RESPIRATORY (INHALATION) EVERY 2 HOUR PRN
Qty: 18 G | Refills: 11 | Status: SHIPPED | OUTPATIENT
Start: 2024-03-12

## 2024-03-12 RX ADMIN — IPRATROPIUM BROMIDE AND ALBUTEROL SULFATE 3 ML: .5; 3 SOLUTION RESPIRATORY (INHALATION) at 09:22

## 2024-03-12 RX ADMIN — IPRATROPIUM BROMIDE AND ALBUTEROL SULFATE 3 ML: .5; 3 SOLUTION RESPIRATORY (INHALATION) at 02:21

## 2024-03-12 RX ADMIN — PANTOPRAZOLE SODIUM 40 MG: 40 INJECTION, POWDER, FOR SOLUTION INTRAVENOUS at 09:41

## 2024-03-12 RX ADMIN — METHYLPREDNISOLONE SODIUM SUCCINATE 40 MG: 40 INJECTION, POWDER, FOR SOLUTION INTRAMUSCULAR; INTRAVENOUS at 03:26

## 2024-03-12 ASSESSMENT — PAIN SCALES - GENERAL: PAINLEVEL_OUTOF10: 0 - NO PAIN

## 2024-03-12 NOTE — DISCHARGE SUMMARY
Discharge Diagnosis  Severe persistent asthma with exacerbation    Issues Requiring Follow-Up  Follow up with new PCP. ( Appointment requested through EMR)    Test Results Pending At Discharge  Pending Labs       No current pending labs.            Hospital Course   Patient is a 33-year-old female with a PMH of Asthma on inhalers with multiple exacerbations, requiring intubation (unclear), polysubstance use ( Utox positive for Amphetamines and Marijuana) initially admitted to MICU due to acute asthma exacerbation. Patient states that 3 days ago she started to feel short of breath. Prior to admission she attempted to use her albuterol nebulize however it was broke. She reported that her breathing became significantly worse therefore she called EMS who brought her to the ED. Patient also endorsed wheezing and nonproductive cough. Patient reported that her symptoms were consistent with her usual asthma exacerbation. She reported an albuterol inhaler at home however it does not give her any relief. She does not have a PCP nor pulmonologist. She states her main triggers are heat. She also states that she things her furnace triggers she asthma therefore she has not turned on the heat in her apartment. She denied sick contact or recent travel. Patient denies headache, syncope, dizziness, nasal congestion, rhinorrhea, dysphagia, neck pain, back pain, nausea, vomiting, abdominal pain, extremity pain, swelling, or any skin rashes.   In the MICU she was treated with IV solumedrol and Bronchodilator nebs with improvement in her symptoms. She was further transferred to medicine floor on 3/11 evening. She was changed to oral prednisone and continued on Bronchodilator nebs with improvement in her symptoms.   She was very much keen to go home on 3/12.  Her wheezing was improved. Discharged home on Symbicort and PRN Albuterol inhalers with Prednisone taper.     Discharged home in a stable condition. Prescriptions sent to [facility].   Discharge day management time > 30 minutes.         Pertinent Physical Exam At Time of Discharge  Vitals:    03/12/24 0333   BP: (!) 138/94   Pulse: 105   Resp: 16   Temp: 36.9 °C (98.4 °F)   SpO2: 98%       Physical Exam  Constitutional:       Comments: Comfortable on RA at rest,    HENT:      Head: Normocephalic.      Nose: Nose normal.      Mouth/Throat:      Mouth: Mucous membranes are moist.   Eyes:      Extraocular Movements: Extraocular movements intact.      Pupils: Pupils are equal, round, and reactive to light.   Cardiovascular:      Rate and Rhythm: Normal rate and regular rhythm.      Heart sounds: Normal heart sounds. No murmur heard.  Pulmonary:      Effort: No respiratory distress.      Breath sounds: Normal breath sounds.      Comments: Wheezes improved,   Abdominal:      General: There is no distension.      Palpations: Abdomen is soft.      Tenderness: There is no abdominal tenderness.   Musculoskeletal:         General: Normal range of motion.      Cervical back: Normal range of motion.   Skin:     General: Skin is dry.      Coloration: Skin is not jaundiced.      Findings: No bruising.   Neurological:      General: No focal deficit present.      Mental Status: She is alert and oriented to person, place, and time.   Psychiatric:         Mood and Affect: Mood normal.         Home Medications     Medication List      START taking these medications     albuterol 90 mcg/actuation inhaler; Inhale 2 puffs every 2 hours if   needed for wheezing.; Replaces: albuterol 2.5 mg /3 mL (0.083 %) nebulizer   solution   budesonide-formoteroL 160-4.5 mcg/actuation inhaler; Commonly known as:   Symbicort; Inhale 2 puffs 2 times a day. Rinse mouth with water after use   to reduce aftertaste and incidence of candidiasis. Do not swallow.   predniSONE 10 mg tablet; Commonly known as: Deltasone; Take 4 tablets   (40 mg) by mouth once daily for 2 days, THEN 3 tablets (30 mg) once daily   for 2  days, THEN 2 tablets (20 mg) once daily for 2 days, THEN 1 tablet   (10 mg) once daily for 2 days.; Start taking on: March 12, 2024     STOP taking these medications     albuterol 2.5 mg /3 mL (0.083 %) nebulizer solution; Replaced by:   albuterol 90 mcg/actuation inhaler       Outpatient Follow-Up  Future Appointments   Date Time Provider Department Center   3/19/2024 10:30 AM  [id number] NURSE [id number] [facility]       Carlos Bahena MD

## 2024-03-12 NOTE — CARE PLAN
The patient's goals for the shift include      The clinical goals for the shift include Patient to report less shortness of breath throughout the shift

## 2024-03-12 NOTE — PROGRESS NOTES
Occupational Therapy                     Therapy Communication Note    Patient Name: Suha Zacarias  MRN: [id number]  Today's Date: 3/12/2024     Discipline: Occupational Therapy    Missed Visit Reason: Missed Visit Reason: Other (Comment)    Missed Time: Attempt    Comment: Pt screened for acute care OT services; received crossed legged sitting upright in bed, fully dressed; reports needed to get up and around room to gather all items as she is extremely eager for discharge; confirmed good support and no concerns for home going; denies concerns of OT at this time; will discontinue current orders, please re-consult should there be a change in pt status.    03/12/24 at 10:14 AM   Shalini Zavala, OT   Rehab Office: [phone]

## 2024-03-12 NOTE — CARE PLAN
The patient's goals for the shift include      The clinical goals for the shift include maintain oxygen saturation on bipap    Over the shift, the patient did not make progress toward the following goals. Patient did not wear her bipap this evening. Patient saturation did remain within normal range. No other needs at this time. Call light within reach.

## 2024-03-12 NOTE — PROGRESS NOTES
Physical Therapy                 Therapy Communication Note/ PT Screen    Patient Name: Suha Zacarias  MRN: [id number]  Today's Date: 3/12/2024     Discipline: Physical Therapy    PT Screen: Pt up independently in room, reports no concerns about mobility and hospital discharge, states she has had asthma and understands how to deal with symptoms in terms of mobility/activity tolerance.  No PT needs identified. Will discharge order.       03/12/24 at 11:27 AM - Dayami Brantley, PT

## 2024-03-19 ENCOUNTER — APPOINTMENT (OUTPATIENT)
Dept: PRIMARY CARE | Facility: CLINIC | Age: 34
End: 2024-03-19
Payer: COMMERCIAL

## 2025-08-22 ENCOUNTER — APPOINTMENT (OUTPATIENT)
Dept: PRIMARY CARE | Facility: CLINIC | Age: 35
End: 2025-08-22
Payer: COMMERCIAL

## 2025-08-29 ENCOUNTER — APPOINTMENT (OUTPATIENT)
Dept: PRIMARY CARE | Facility: CLINIC | Age: 35
End: 2025-08-29
Payer: COMMERCIAL